# Patient Record
Sex: MALE | Race: OTHER | Employment: PART TIME | URBAN - METROPOLITAN AREA
[De-identification: names, ages, dates, MRNs, and addresses within clinical notes are randomized per-mention and may not be internally consistent; named-entity substitution may affect disease eponyms.]

---

## 2022-10-31 ENCOUNTER — HOSPITAL ENCOUNTER (EMERGENCY)
Age: 27
Discharge: HOME | End: 2022-10-31
Attending: EMERGENCY MEDICINE
Payer: MEDICAID

## 2022-10-31 VITALS
HEART RATE: 69 BPM | BODY MASS INDEX: 24.34 KG/M2 | WEIGHT: 170 LBS | RESPIRATION RATE: 16 BRPM | HEIGHT: 70 IN | DIASTOLIC BLOOD PRESSURE: 68 MMHG | TEMPERATURE: 97.1 F | OXYGEN SATURATION: 97 % | SYSTOLIC BLOOD PRESSURE: 111 MMHG

## 2022-10-31 DIAGNOSIS — H65.00 ACUTE SEROUS OTITIS MEDIA, RECURRENCE NOT SPECIFIED, UNSPECIFIED LATERALITY: ICD-10-CM

## 2022-10-31 DIAGNOSIS — H60.391 INFECTIVE OTITIS EXTERNA OF RIGHT EAR: Primary | ICD-10-CM

## 2022-10-31 PROCEDURE — 99283 EMERGENCY DEPT VISIT LOW MDM: CPT

## 2022-10-31 PROCEDURE — 6370000000 HC RX 637 (ALT 250 FOR IP): Performed by: STUDENT IN AN ORGANIZED HEALTH CARE EDUCATION/TRAINING PROGRAM

## 2022-10-31 RX ORDER — AMOXICILLIN 250 MG/1
500 CAPSULE ORAL ONCE
Status: COMPLETED | OUTPATIENT
Start: 2022-10-31 | End: 2022-10-31

## 2022-10-31 RX ORDER — AMOXICILLIN 500 MG/1
500 CAPSULE ORAL 2 TIMES DAILY
Qty: 14 CAPSULE | Refills: 0 | Status: SHIPPED | OUTPATIENT
Start: 2022-10-31 | End: 2022-11-07

## 2022-10-31 RX ORDER — CIPROFLOXACIN AND DEXAMETHASONE 3; 1 MG/ML; MG/ML
4 SUSPENSION/ DROPS AURICULAR (OTIC) ONCE
Status: COMPLETED | OUTPATIENT
Start: 2022-10-31 | End: 2022-10-31

## 2022-10-31 RX ADMIN — CIPROFLOXACIN AND DEXAMETHASONE 4 DROP: 3; 1 SUSPENSION/ DROPS AURICULAR (OTIC) at 09:42

## 2022-10-31 RX ADMIN — AMOXICILLIN 500 MG: 250 CAPSULE ORAL at 08:25

## 2022-10-31 ASSESSMENT — ENCOUNTER SYMPTOMS
VOMITING: 0
ABDOMINAL PAIN: 0
RHINORRHEA: 1
COUGH: 0
DIARRHEA: 0
SHORTNESS OF BREATH: 0
NAUSEA: 0

## 2022-10-31 ASSESSMENT — PAIN SCALES - GENERAL: PAINLEVEL_OUTOF10: 8

## 2022-10-31 ASSESSMENT — PAIN - FUNCTIONAL ASSESSMENT: PAIN_FUNCTIONAL_ASSESSMENT: 0-10

## 2022-10-31 ASSESSMENT — PAIN DESCRIPTION - ORIENTATION: ORIENTATION: RIGHT

## 2022-10-31 ASSESSMENT — PAIN DESCRIPTION - LOCATION: LOCATION: EAR

## 2022-10-31 NOTE — ED PROVIDER NOTES
[facility] ED     Emergency Department     Faculty Attestation    I performed a history and physical examination of the patient and discussed management with the resident. I reviewed the residents note and agree with the documented findings and plan of care. Any areas of disagreement are noted on the chart. I was personally present for the key portions of any procedures. I have documented in the chart those procedures where I was not present during the key portions. I have reviewed the emergency nurses triage note. I agree with the chief complaint, past medical history, past surgical history, allergies, medications, social and family history as documented unless otherwise noted below. For Physician Assistant/ Nurse Practitioner cases/documentation I have personally evaluated this patient and have completed at least one if not all key elements of the E/M (history, physical exam, and MDM). Additional findings are as noted. Patient here with right ear pain, ongoing for the last week. Is tried over-the-counter drops with no relief. On exam left TM and canal are normal.  Right canal granular white discharge pain with insertion of speculum consistent with externa. However also can visualize the majority of the TM dull red with an effusion loss of landmarks. No mastoid tenderness. Will treat for both media and externa.       Critical Care     none    Maryam Lee MD, Sami Mora  Attending Emergency  Physician           Maryam Lee MD  10/31/22 1722

## 2022-10-31 NOTE — ED PROVIDER NOTES
[facility]  ED  Emergency Department Encounter  EmergencyMedicineResident       Pt Name: Amparo Harvey  MRN: [id number]  [place] 1995  Date of evaluation: 10/31/22  PCP: No primary care provider on file. CHIEF COMPLAINT       Chief Complaint   Patient presents with    Otalgia       HISTORY OF PRESENT ILLNESS  (Location/Symptom, Timing/Onset, Context/Setting, Quality, Duration, Modifying Factors, Severity.)      Amparo Harvey is a 23 y.o. male who presents evaluation today for right ear pain. It has been going on now for 1 week. Not improving with OTC ear drops. He is also had some nasal congestion and rhinorrhea. No fevers or chills. No nausea and diarrhea. Nothing has resolved his pain and is now getting worse. Symptoms are constant. PAST MEDICAL / SURGICAL /SOCIAL / FAMILY HISTORY      has no past medical history on file. has no past surgical history on file. Social History     Socioeconomic History    Marital status: Single     Spouse name: Not on file    Number of children: Not on file    Years of education: Not on file    Highest education level: Not on file   Occupational History    Not on file   Tobacco Use    Smoking status: Not on file    Smokeless tobacco: Not on file   Substance and Sexual Activity    Alcohol use: Not on file    Drug use: Not on file    Sexual activity: Not on file   Other Topics Concern    Not on file   Social History Narrative    Not on file     Social Determinants of Health     Financial Resource Strain: Not on file   Food Insecurity: Not on file   Transportation Needs: Not on file   Physical Activity: Not on file   Stress: Not on file   Social Connections: Not on file   Intimate Partner Violence: Not on file   Housing Stability: Not on file       History reviewed. No pertinent family history. Allergies:  Patient has no known allergies.     Home Medications:  Prior to Admission medications    Medication Sig Start Date End Date Taking? Authorizing Provider   amoxicillin (AMOXIL) 500 MG capsule Take 1 capsule by mouth 2 times daily for 7 days 10/31/22 11/7/22 Yes Dave Craft MD       REVIEW OF SYSTEMS    (2-9 systems for level 4, 10 or more forlevel 5)      Review of Systems   Constitutional:  Negative for chills and fever. HENT:  Positive for congestion, ear discharge, ear pain and rhinorrhea. Respiratory:  Negative for cough and shortness of breath. Cardiovascular:  Negative for chest pain. Gastrointestinal:  Negative for abdominal pain, diarrhea, nausea and vomiting. PHYSICAL EXAM   (up to 7 for level 4, 8 or more forlevel 5)      ED TRIAGE VITALS BP: 111/68, Temp: 97.1 °F (36.2 °C), Heart Rate: 69, Resp: 16, SpO2: 97 %    Vitals:    10/31/22 0813 10/31/22 0942   BP: 111/68    Pulse: 69    Resp:  16   Temp: 97.1 °F (36.2 °C)    TempSrc: Oral    SpO2: 97%    Weight: 170 lb (77.1 kg)    Height: 5' 10\" (1.778 m)          Physical Exam  Vitals and nursing note reviewed. Constitutional:       Appearance: Normal appearance. HENT:      Head: Normocephalic and atraumatic. Ears:      Comments: Erythematous swollen right external auditory canal, effusion and erythema noted to the right TM, no mastoid tenderness, left ear normal     Nose: Nose normal.      Mouth/Throat:      Mouth: Mucous membranes are moist.   Eyes:      Extraocular Movements: Extraocular movements intact. Pupils: Pupils are equal, round, and reactive to light. Cardiovascular:      Rate and Rhythm: Normal rate and regular rhythm. Pulses: Normal pulses. Heart sounds: Normal heart sounds. Pulmonary:      Effort: Pulmonary effort is normal.      Breath sounds: Normal breath sounds. Abdominal:      General: Abdomen is flat. Palpations: Abdomen is soft. Musculoskeletal:         General: Normal range of motion. Cervical back: Normal range of motion. Skin:     General: Skin is warm and dry.       Capillary Refill: Capillary refill takes less than 2 seconds. Neurological:      General: No focal deficit present. Mental Status: He is alert and oriented to person, place, and time. Psychiatric:         Mood and Affect: Mood normal.         Behavior: Behavior normal.         DIFFERENTIAL  DIAGNOSIS     PLAN (LABS / IMAGING / EKG):  No orders of the defined types were placed in this encounter. MEDICATIONS ORDERED:  ED Medication Orders (From admission, onward)      Start Ordered     Status Ordering Provider    10/31/22 0830 10/31/22 0823  amoxicillin (AMOXIL) capsule 500 mg  ONCE        Question:  Antimicrobial Indications  Answer:  Head and Neck Infection    Last MAR action: Given - by Comfort Bernardo on 10/31/22 at [facility], [address]    10/31/22 0830 10/31/22 0823  ciprofloxacin-dexamethasone (CIPRODEX) otic suspension 4 drop  ONCE         Last MAR action: Given - by Comfort Bernardo on 10/31/22 at [address] [facility]              DIAGNOSTIC RESULTS / EMERGENCY DEPARTMENT COURSE / MDM     IMPRESSION & INITIAL PLAN:  Patient presenting today for evaluation of right ear pain. It has been going on now for 1 week. Not improving with OTC ear drops. On exam he has an otitis externa and otitis media of the right ear. No other extension. Mastoid nontender. Will start on otic and oral antibiotics. LABS:  No results found for this visit on 10/31/22. RADIOLOGY:  No orders to display       FINAL IMPRESSION      1. Infective otitis externa of right ear    2.  Acute serous otitis media, recurrence not specified, unspecified laterality          DISPOSITION / PLAN     DISPOSITION Decision To Discharge 10/31/2022 08:23:08 AM      PATIENT REFERRED TO:  [address] [place] [id number] [phone]  In 1 week  For wound re-check      DISCHARGE MEDICATIONS:  Discharge Medication List as of 10/31/2022  8:24 AM        START taking these medications    Details   amoxicillin (AMOXIL) 500 MG capsule Take 1 capsule by mouth 2 times daily for 7 days, Disp-14 capsule, R-0Print           Discharge Medication List as of 10/31/2022  8:24 AM           Jonatan Driscoll MD  Emergency Medicine Resident    (Please note that portions of this note were completed with a voice recognition program.  Efforts were made to edit the dictations but occasionally words are mis-transcribed.)       Jonatan Driscoll MD  Resident  10/31/22 0076

## 2022-10-31 NOTE — DISCHARGE INSTRUCTIONS
Place the eardrops in the right ear 2 times daily for 7 days. Take the antibiotics 1 pill 2 times daily for 7 days. Follow-up with her primary care physician to reevaluate your right ear. Return the emergency department for any emergencies. He can take Tylenol or Motrin as directed on the bottle for symptom relief and fever relief.

## 2023-04-14 ENCOUNTER — ANESTHESIA EVENT (OUTPATIENT)
Dept: OPERATING ROOM | Age: 28
End: 2023-04-14
Payer: MEDICAID

## 2023-04-17 ENCOUNTER — APPOINTMENT (OUTPATIENT)
Dept: GENERAL RADIOLOGY | Age: 28
End: 2023-04-17
Attending: PLASTIC SURGERY
Payer: MEDICAID

## 2023-04-17 ENCOUNTER — HOSPITAL ENCOUNTER (OUTPATIENT)
Age: 28
Setting detail: OUTPATIENT SURGERY
Discharge: HOME | End: 2023-04-17
Attending: PLASTIC SURGERY | Admitting: PLASTIC SURGERY
Payer: MEDICAID

## 2023-04-17 ENCOUNTER — ANESTHESIA (OUTPATIENT)
Dept: OPERATING ROOM | Age: 28
End: 2023-04-17
Payer: MEDICAID

## 2023-04-17 VITALS
RESPIRATION RATE: 18 BRPM | OXYGEN SATURATION: 100 % | HEIGHT: 71 IN | HEART RATE: 57 BPM | BODY MASS INDEX: 24.53 KG/M2 | WEIGHT: 175.25 LBS | TEMPERATURE: 97.1 F | DIASTOLIC BLOOD PRESSURE: 74 MMHG | SYSTOLIC BLOOD PRESSURE: 119 MMHG

## 2023-04-17 DIAGNOSIS — G89.18 PAIN FOLLOWING SURGERY OR PROCEDURE: Primary | ICD-10-CM

## 2023-04-17 PROCEDURE — 6360000002 HC RX W HCPCS: Performed by: PLASTIC SURGERY

## 2023-04-17 PROCEDURE — 6360000002 HC RX W HCPCS

## 2023-04-17 PROCEDURE — 6370000000 HC RX 637 (ALT 250 FOR IP): Performed by: PLASTIC SURGERY

## 2023-04-17 PROCEDURE — 2580000003 HC RX 258: Performed by: ANESTHESIOLOGY

## 2023-04-17 PROCEDURE — 2580000003 HC RX 258: Performed by: PLASTIC SURGERY

## 2023-04-17 PROCEDURE — 6370000000 HC RX 637 (ALT 250 FOR IP)

## 2023-04-17 PROCEDURE — 3700000001 HC ADD 15 MINUTES (ANESTHESIA): Performed by: PLASTIC SURGERY

## 2023-04-17 PROCEDURE — 2709999900 HC NON-CHARGEABLE SUPPLY: Performed by: PLASTIC SURGERY

## 2023-04-17 PROCEDURE — 3600000002 HC SURGERY LEVEL 2 BASE: Performed by: PLASTIC SURGERY

## 2023-04-17 PROCEDURE — 7100000001 HC PACU RECOVERY - ADDTL 15 MIN: Performed by: PLASTIC SURGERY

## 2023-04-17 PROCEDURE — 3209999900 FLUORO FOR SURGICAL PROCEDURES

## 2023-04-17 PROCEDURE — 7100000000 HC PACU RECOVERY - FIRST 15 MIN: Performed by: PLASTIC SURGERY

## 2023-04-17 PROCEDURE — 7100000010 HC PHASE II RECOVERY - FIRST 15 MIN: Performed by: PLASTIC SURGERY

## 2023-04-17 PROCEDURE — 7100000011 HC PHASE II RECOVERY - ADDTL 15 MIN: Performed by: PLASTIC SURGERY

## 2023-04-17 PROCEDURE — 3700000000 HC ANESTHESIA ATTENDED CARE: Performed by: PLASTIC SURGERY

## 2023-04-17 PROCEDURE — 2500000003 HC RX 250 WO HCPCS: Performed by: PLASTIC SURGERY

## 2023-04-17 PROCEDURE — C1713 ANCHOR/SCREW BN/BN,TIS/BN: HCPCS | Performed by: PLASTIC SURGERY

## 2023-04-17 PROCEDURE — 3600000012 HC SURGERY LEVEL 2 ADDTL 15MIN: Performed by: PLASTIC SURGERY

## 2023-04-17 PROCEDURE — 2500000003 HC RX 250 WO HCPCS

## 2023-04-17 DEVICE — K WIRE FIX L6IN DIA0.045IN 1600645] MICROAIRE SURGICAL INSTRUMENTS INC]: Type: IMPLANTABLE DEVICE | Site: LITTLE FINGER | Status: FUNCTIONAL

## 2023-04-17 RX ORDER — MORPHINE SULFATE 2 MG/ML
1 INJECTION, SOLUTION INTRAMUSCULAR; INTRAVENOUS EVERY 5 MIN PRN
Status: DISCONTINUED | OUTPATIENT
Start: 2023-04-17 | End: 2023-04-17 | Stop reason: HOSPADM

## 2023-04-17 RX ORDER — DIPHENHYDRAMINE HYDROCHLORIDE 50 MG/ML
12.5 INJECTION INTRAMUSCULAR; INTRAVENOUS
Status: DISCONTINUED | OUTPATIENT
Start: 2023-04-17 | End: 2023-04-17 | Stop reason: HOSPADM

## 2023-04-17 RX ORDER — OXYCODONE HYDROCHLORIDE AND ACETAMINOPHEN 5; 325 MG/1; MG/1
1 TABLET ORAL EVERY 6 HOURS PRN
Qty: 28 TABLET | Refills: 0 | Status: SHIPPED | OUTPATIENT
Start: 2023-04-17 | End: 2023-04-24

## 2023-04-17 RX ORDER — SODIUM CHLORIDE 0.9 % (FLUSH) 0.9 %
5-40 SYRINGE (ML) INJECTION PRN
Status: DISCONTINUED | OUTPATIENT
Start: 2023-04-17 | End: 2023-04-17 | Stop reason: HOSPADM

## 2023-04-17 RX ORDER — CEPHALEXIN 500 MG/1
500 CAPSULE ORAL 3 TIMES DAILY
Qty: 21 CAPSULE | Refills: 0 | Status: SHIPPED | OUTPATIENT
Start: 2023-04-17 | End: 2023-04-24

## 2023-04-17 RX ORDER — PROPOFOL 10 MG/ML
INJECTION, EMULSION INTRAVENOUS PRN
Status: DISCONTINUED | OUTPATIENT
Start: 2023-04-17 | End: 2023-04-17 | Stop reason: SDUPTHER

## 2023-04-17 RX ORDER — SODIUM CHLORIDE 9 MG/ML
25 INJECTION, SOLUTION INTRAVENOUS PRN
Status: DISCONTINUED | OUTPATIENT
Start: 2023-04-17 | End: 2023-04-17 | Stop reason: HOSPADM

## 2023-04-17 RX ORDER — DEXAMETHASONE SODIUM PHOSPHATE 10 MG/ML
INJECTION, SOLUTION INTRAMUSCULAR; INTRAVENOUS PRN
Status: DISCONTINUED | OUTPATIENT
Start: 2023-04-17 | End: 2023-04-17 | Stop reason: SDUPTHER

## 2023-04-17 RX ORDER — SODIUM CHLORIDE 9 MG/ML
INJECTION, SOLUTION INTRAVENOUS PRN
Status: DISCONTINUED | OUTPATIENT
Start: 2023-04-17 | End: 2023-04-17 | Stop reason: HOSPADM

## 2023-04-17 RX ORDER — GINSENG 100 MG
CAPSULE ORAL
Status: DISCONTINUED
Start: 2023-04-17 | End: 2023-04-17 | Stop reason: HOSPADM

## 2023-04-17 RX ORDER — SODIUM CHLORIDE 0.9 % (FLUSH) 0.9 %
5-40 SYRINGE (ML) INJECTION EVERY 12 HOURS SCHEDULED
Status: DISCONTINUED | OUTPATIENT
Start: 2023-04-17 | End: 2023-04-17 | Stop reason: HOSPADM

## 2023-04-17 RX ORDER — GINSENG 100 MG
CAPSULE ORAL PRN
Status: DISCONTINUED | OUTPATIENT
Start: 2023-04-17 | End: 2023-04-17 | Stop reason: ALTCHOICE

## 2023-04-17 RX ORDER — OXYCODONE HYDROCHLORIDE AND ACETAMINOPHEN 5; 325 MG/1; MG/1
TABLET ORAL
Status: COMPLETED
Start: 2023-04-17 | End: 2023-04-17

## 2023-04-17 RX ORDER — OXYCODONE HYDROCHLORIDE 5 MG/1
10 TABLET ORAL PRN
Status: DISCONTINUED | OUTPATIENT
Start: 2023-04-17 | End: 2023-04-17 | Stop reason: HOSPADM

## 2023-04-17 RX ORDER — MIDAZOLAM HYDROCHLORIDE 1 MG/ML
INJECTION INTRAMUSCULAR; INTRAVENOUS PRN
Status: DISCONTINUED | OUTPATIENT
Start: 2023-04-17 | End: 2023-04-17 | Stop reason: SDUPTHER

## 2023-04-17 RX ORDER — HYDRALAZINE HYDROCHLORIDE 20 MG/ML
10 INJECTION INTRAMUSCULAR; INTRAVENOUS
Status: DISCONTINUED | OUTPATIENT
Start: 2023-04-17 | End: 2023-04-17 | Stop reason: HOSPADM

## 2023-04-17 RX ORDER — LIDOCAINE HYDROCHLORIDE 10 MG/ML
INJECTION, SOLUTION INFILTRATION; PERINEURAL PRN
Status: DISCONTINUED | OUTPATIENT
Start: 2023-04-17 | End: 2023-04-17 | Stop reason: SDUPTHER

## 2023-04-17 RX ORDER — KETOROLAC TROMETHAMINE 30 MG/ML
INJECTION, SOLUTION INTRAMUSCULAR; INTRAVENOUS PRN
Status: DISCONTINUED | OUTPATIENT
Start: 2023-04-17 | End: 2023-04-17 | Stop reason: SDUPTHER

## 2023-04-17 RX ORDER — METOCLOPRAMIDE HYDROCHLORIDE 5 MG/ML
10 INJECTION INTRAMUSCULAR; INTRAVENOUS
Status: DISCONTINUED | OUTPATIENT
Start: 2023-04-17 | End: 2023-04-17 | Stop reason: HOSPADM

## 2023-04-17 RX ORDER — CEFAZOLIN 2 G/1
INJECTION, POWDER, FOR SOLUTION INTRAMUSCULAR; INTRAVENOUS
Status: DISCONTINUED
Start: 2023-04-17 | End: 2023-04-17 | Stop reason: HOSPADM

## 2023-04-17 RX ORDER — OXYCODONE HYDROCHLORIDE 5 MG/1
5 TABLET ORAL PRN
Status: DISCONTINUED | OUTPATIENT
Start: 2023-04-17 | End: 2023-04-17 | Stop reason: HOSPADM

## 2023-04-17 RX ORDER — FENTANYL CITRATE 50 UG/ML
INJECTION, SOLUTION INTRAMUSCULAR; INTRAVENOUS PRN
Status: DISCONTINUED | OUTPATIENT
Start: 2023-04-17 | End: 2023-04-17 | Stop reason: SDUPTHER

## 2023-04-17 RX ORDER — GLYCOPYRROLATE 1 MG/5 ML
SYRINGE (ML) INTRAVENOUS PRN
Status: DISCONTINUED | OUTPATIENT
Start: 2023-04-17 | End: 2023-04-17 | Stop reason: SDUPTHER

## 2023-04-17 RX ORDER — SODIUM CHLORIDE, SODIUM LACTATE, POTASSIUM CHLORIDE, CALCIUM CHLORIDE 600; 310; 30; 20 MG/100ML; MG/100ML; MG/100ML; MG/100ML
INJECTION, SOLUTION INTRAVENOUS CONTINUOUS
Status: DISCONTINUED | OUTPATIENT
Start: 2023-04-17 | End: 2023-04-17 | Stop reason: HOSPADM

## 2023-04-17 RX ORDER — BUPIVACAINE HYDROCHLORIDE 2.5 MG/ML
INJECTION, SOLUTION INFILTRATION; PERINEURAL PRN
Status: DISCONTINUED | OUTPATIENT
Start: 2023-04-17 | End: 2023-04-17 | Stop reason: ALTCHOICE

## 2023-04-17 RX ORDER — MEPERIDINE HYDROCHLORIDE 50 MG/ML
12.5 INJECTION INTRAMUSCULAR; INTRAVENOUS; SUBCUTANEOUS ONCE
Status: DISCONTINUED | OUTPATIENT
Start: 2023-04-17 | End: 2023-04-17 | Stop reason: HOSPADM

## 2023-04-17 RX ORDER — ONDANSETRON 2 MG/ML
4 INJECTION INTRAMUSCULAR; INTRAVENOUS
Status: DISCONTINUED | OUTPATIENT
Start: 2023-04-17 | End: 2023-04-17 | Stop reason: HOSPADM

## 2023-04-17 RX ORDER — ONDANSETRON 2 MG/ML
INJECTION INTRAMUSCULAR; INTRAVENOUS PRN
Status: DISCONTINUED | OUTPATIENT
Start: 2023-04-17 | End: 2023-04-17 | Stop reason: SDUPTHER

## 2023-04-17 RX ORDER — OXYCODONE HYDROCHLORIDE AND ACETAMINOPHEN 5; 325 MG/1; MG/1
1 TABLET ORAL ONCE
Status: COMPLETED | OUTPATIENT
Start: 2023-04-17 | End: 2023-04-17

## 2023-04-17 RX ADMIN — PROPOFOL 200 MG: 10 INJECTION, EMULSION INTRAVENOUS at 10:33

## 2023-04-17 RX ADMIN — ONDANSETRON 4 MG: 2 INJECTION INTRAMUSCULAR; INTRAVENOUS at 10:56

## 2023-04-17 RX ADMIN — SODIUM CHLORIDE, POTASSIUM CHLORIDE, SODIUM LACTATE AND CALCIUM CHLORIDE: 600; 310; 30; 20 INJECTION, SOLUTION INTRAVENOUS at 10:02

## 2023-04-17 RX ADMIN — DEXAMETHASONE SODIUM PHOSPHATE 5 MG: 10 INJECTION, SOLUTION INTRAMUSCULAR; INTRAVENOUS at 10:41

## 2023-04-17 RX ADMIN — FENTANYL CITRATE 100 MCG: 50 INJECTION, SOLUTION INTRAMUSCULAR; INTRAVENOUS at 10:33

## 2023-04-17 RX ADMIN — MIDAZOLAM 4 MG: 1 INJECTION INTRAMUSCULAR; INTRAVENOUS at 10:29

## 2023-04-17 RX ADMIN — KETOROLAC TROMETHAMINE 30 MG: 30 INJECTION, SOLUTION INTRAMUSCULAR at 10:56

## 2023-04-17 RX ADMIN — Medication 0.5 MG: at 11:40

## 2023-04-17 RX ADMIN — OXYCODONE HYDROCHLORIDE AND ACETAMINOPHEN 1 TABLET: 5; 325 TABLET ORAL at 11:51

## 2023-04-17 RX ADMIN — CEFAZOLIN 2000 MG: 2 INJECTION, POWDER, FOR SOLUTION INTRAMUSCULAR; INTRAVENOUS at 10:38

## 2023-04-17 RX ADMIN — OXYCODONE AND ACETAMINOPHEN 1 TABLET: 5; 325 TABLET ORAL at 11:51

## 2023-04-17 RX ADMIN — HYDROMORPHONE HYDROCHLORIDE 0.5 MG: 1 INJECTION, SOLUTION INTRAMUSCULAR; INTRAVENOUS; SUBCUTANEOUS at 11:40

## 2023-04-17 RX ADMIN — LIDOCAINE HYDROCHLORIDE 40 MG: 10 INJECTION, SOLUTION INFILTRATION; PERINEURAL at 10:33

## 2023-04-17 RX ADMIN — Medication 0.3 MG: at 11:12

## 2023-04-17 ASSESSMENT — PAIN SCALES - GENERAL
PAINLEVEL_OUTOF10: 8
PAINLEVEL_OUTOF10: 9
PAINLEVEL_OUTOF10: 8
PAINLEVEL_OUTOF10: 8
PAINLEVEL_OUTOF10: 7

## 2023-04-17 ASSESSMENT — PAIN DESCRIPTION - LOCATION
LOCATION: HAND
LOCATION: ARM

## 2023-04-17 ASSESSMENT — PAIN - FUNCTIONAL ASSESSMENT: PAIN_FUNCTIONAL_ASSESSMENT: 0-10

## 2023-04-17 ASSESSMENT — PAIN DESCRIPTION - ORIENTATION
ORIENTATION: LEFT

## 2023-04-17 ASSESSMENT — PAIN DESCRIPTION - DESCRIPTORS
DESCRIPTORS: THROBBING
DESCRIPTORS: THROBBING
DESCRIPTORS: ACHING;THROBBING
DESCRIPTORS: ACHING;THROBBING
DESCRIPTORS: DULL

## 2023-04-17 ASSESSMENT — PAIN DESCRIPTION - PAIN TYPE: TYPE: SURGICAL PAIN

## 2023-04-17 NOTE — BRIEF OP NOTE
Brief Postoperative Note      Patient: Brian Sutton  YOB: 1995  MRN: [id number]    Date of Procedure: 4/17/2023    Pre-Op diagnosis: Displaced fracture base of left little finger proximal phalanx    Post-Op Diagnosis: Same       Procedure(s):  LEFT LITTLE FINGER CLOSED REDUCTION PINNING using 2x0. 045 K wires    Surgeon(s): Keely Fung MD    Assistant:  Resident: Hernando Hernandez DO    Anesthesia: General    Estimated Blood Loss (mL): Minimal    Complications: None    Specimens:   * No specimens in log *    Implants:  Implant Name Type Inv.  Item Serial No.  Lot No. LRB No. Used Action   K WIRE FIX L6IN DIA0.045IN [id number]] [facility] [employer]] - [id number]  K WIRE FIX L6IN DIA0.045IN [id number]] [employer]]  [facility] [employer]-WD [id number] Left 2 Implanted         Drains: * No LDAs found *    Findings: Displaced fracture of distal phalanx of left little finger, near-anatomic reduction post-pin placement      Electronically signed by Sandra Zhu DO on 4/17/2023 at 11:05 AM

## 2023-04-17 NOTE — ANESTHESIA POSTPROCEDURE EVALUATION
POST- ANESTHESIA EVALUATION       Pt Name: Herb Vanegas  MRN: [id number]  [place]: 1995  Date of evaluation: 4/17/2023  Time:  12:32 PM      /74   Pulse 57   Temp 97.1 °F (36.2 °C) (Temporal)   Resp 18   Ht 5' 11\" (1.803 m)   Wt 175 lb 4 oz (79.5 kg)   SpO2 100%   BMI 24.44 kg/m²      Consciousness Level  Awake  Cardiopulmonary Status  Stable  Pain Adequately Treated YES  Nausea / Vomiting  NO  Adequate Hydration  YES  Anesthesia Related Complications NONE      Electronically signed by Nichole Velazco MD on 4/17/2023 at 12:32 PM       Department of Anesthesiology  Postprocedure Note    Patient: Herb Vanegas  MRN: [id number]  [place]: 1995  Date of evaluation: 4/17/2023      Procedure Summary     Date: 04/17/23 Room / Location: [address]    Anesthesia Start: 6948 Anesthesia Stop: 7975    Procedure: LEFT LITTLE FINGER CLOSED REDUCTION PINNING (Left: Little Finger) Diagnosis:       Displaced fracture of distal phalanx of left little finger, initial encounter for closed fracture      (Displaced fracture of distal phalanx of left little finger, initial encounter for closed fracture [R28.519H])    Surgeons: Nita Su MD Responsible Provider: Nichole Velazco MD    Anesthesia Type: general ASA Status: 2          Anesthesia Type: No value filed.     Sundar Phase I: Sundar Score: 9    Sundar Phase II: Sundar Score: 10      Anesthesia Post Evaluation

## 2023-04-17 NOTE — OP NOTE
Operative Note      Patient: Silas Pitt  YOB: 1995  MRN: [id number]    Date of Procedure: 4/17/2023    Pre-Op Diagnosis Codes:     * Displaced fracture of distal phalanx of left little finger, initial encounter for closed fracture [S62.637A]    Post-Op Diagnosis: Same       Procedure(s):  LEFT LITTLE FINGER CLOSED REDUCTION PINNING  Close reduction and percutaneous pinning left lower finger proximal phalanx fracture  Using 2x0. 045 K wires  Surgeon(s): Hood Guzman MD    Assistant:   Resident: Bernadine Kiser DO    Anesthesia: General    Estimated Blood Loss (mL): Minimal    Complications: None    Specimens:   * No specimens in log *    Implants:  Implant Name Type Inv. Item Serial No.  Lot No. LRB No. Used Action   K WIRE FIX L6IN DIA0.045IN [id number]] [facility] [employer]] - [id number]  K WIRE FIX L6IN DIA0.045IN [id number]] [employer]]  [facility] [employer]-WD [id number] Left 2 Implanted         Drains: * No LDAs found *    Findings: Good anatomic alignment and good pin placement confirmed on fluoroscopy. Detailed Description of Procedure: The patient was brought into the operating room and placed under general anesthesia. His left arm was prepped and draped in sterile fashion. Fluoroscopy confirmed the fracture at the base of the left little finger proximal phalanx. A closed duction was performed. Fluoroscopy confirmed good reduction of the fracture. 2x0.045 K wires were placed across the fracture fragments in a crisscross fashion. Fluoroscopy confirmed good pin placement. The pins were cut and Jurgan balls placed. 0.25% Marcaine plain was injected for postoperative pain control. A volar splint was applied and he was taken to postop recovery in stable condition.     Electronically signed by Hood Guzman MD on 4/17/2023 at 11:10 AM

## 2023-04-17 NOTE — INTERVAL H&P NOTE
Update History & Physical    The patient's History and Physical of April 10, 2023 was reviewed with the patient and I examined the patient. There was no change. The surgical site was confirmed by the patient and me. Plan: The risks, benefits, expected outcome, and alternative to the recommended procedure have been discussed with the patient. Patient understands and wants to proceed with the procedure.      Electronically signed by Adrian Inman DO on 4/17/2023 at 10:19 AM

## 2023-05-02 PROBLEM — S62.619A CLOSED FRACTURE OF PROXIMAL PHALANX OF LEFT HAND: Status: ACTIVE | Noted: 2023-05-02

## 2024-04-13 ENCOUNTER — HOSPITAL ENCOUNTER (EMERGENCY)
Age: 29
Discharge: HOME | End: 2024-04-13
Attending: EMERGENCY MEDICINE
Payer: MEDICAID

## 2024-04-13 VITALS
DIASTOLIC BLOOD PRESSURE: 74 MMHG | WEIGHT: 171.96 LBS | RESPIRATION RATE: 16 BRPM | SYSTOLIC BLOOD PRESSURE: 122 MMHG | TEMPERATURE: 97.9 F | HEART RATE: 73 BPM | OXYGEN SATURATION: 100 % | BODY MASS INDEX: 23.98 KG/M2

## 2024-04-13 DIAGNOSIS — Z20.2 EXPOSURE TO STD: Primary | ICD-10-CM

## 2024-04-13 PROCEDURE — 87661 TRICHOMONAS VAGINALIS AMPLIF: CPT

## 2024-04-13 PROCEDURE — 99283 EMERGENCY DEPT VISIT LOW MDM: CPT

## 2024-04-13 PROCEDURE — 87491 CHLMYD TRACH DNA AMP PROBE: CPT

## 2024-04-13 PROCEDURE — 87591 N.GONORRHOEAE DNA AMP PROB: CPT

## 2024-04-13 ASSESSMENT — ENCOUNTER SYMPTOMS: COUGH: 0

## 2024-04-13 ASSESSMENT — PAIN - FUNCTIONAL ASSESSMENT: PAIN_FUNCTIONAL_ASSESSMENT: NONE - DENIES PAIN

## 2024-04-13 NOTE — ED NOTES
The following labs were labeled with patient stickers & tubed to lab;    []Lavender   []On Ice  []Blue  []Green/ Yellow  []Green/ Black []On Ice  []Pink  []Red  []Yellow    []COVID-19 Swab []Rapid    [x]Urine Sample  []Pelvic Cultures    []Blood Cultures

## 2024-04-14 LAB
SOURCE: ABNORMAL
TRICHOMONAS VAGINALI, MOLECULAR: POSITIVE

## 2024-04-14 NOTE — ED NOTES
Pt presents to the ED with concern for std.  Pt denies any symptoms at this time but would like to be tested.  Pt A&O x 4, does not appear in acute distress, RR even and unlabored.  Pt sitting in a chair with eyes open.   Vital signs obtained, medical hx and allergies reviewed with pt.   Triage completed.

## 2024-04-14 NOTE — ED PROVIDER NOTES
[facility] ED  EMERGENCY DEPARTMENT ENCOUNTER      Pt Name: Rogelio Solorio  MRN: [id number]  Birthdate 1995  Date of evaluation: 4/13/24  PCP:  No primary care provider on file.    CHIEF COMPLAINT:   Chief Complaint   Patient presents with    Exposure to STD     HISTORY OF PRESENT ILLNESS   Rogelio Solorio is a 28 y.o. male whopresents with requesting testing for STDs.  He thinks he was exposed to an STD but he is not \"100% sure\".  He denies any complaints he has no dysuria penile pain or lesions.  He denies any other symptoms no fevers, chills, chest pain, cough, shortness of breath.        REVIEW OF SYSTEMS       Review of Systems   Constitutional:  Negative for chills and fever.   Respiratory:  Negative for cough.    Cardiovascular:  Negative for chest pain.   Genitourinary:  Negative for dysuria, flank pain, frequency, hematuria and urgency.           PAST MEDICAL HISTORY   PMH:  has a past medical history of Schizophrenia ([facility]).  SurgicalHistory:  has a past surgical history that includes Femur fracture surgery (Bilateral); Finger closed reduction w/ percutaneous pinning (Left, 04/17/2023); and Finger Closed Reduction (Left, 4/17/2023).  Social History:  reports that he has never smoked. He has never used smokeless tobacco. He reports that he does not currently use alcohol. He reports current drug use. Frequency: 7.00 times per week. Drug: Marijuana (Weed).  Family History: Noncontributory at this time  Psychiatric History: Noncontributory at this time    Allergies:has No Known Allergies.      PHYSICAL EXAM     INITIAL VITALS: /74   Pulse 73   Temp 97.9 °F (36.6 °C) (Oral)   Resp 16   Wt 78 kg (171 lb 15.3 oz)   SpO2 100%   BMI 23.98 kg/m²      Physical Exam  Constitutional:       General: He is not in acute distress.     Appearance: He is well-developed. He is not diaphoretic.   HENT:      Head: Normocephalic and atraumatic.   Eyes:      General: No scleral icterus.

## 2024-04-15 ENCOUNTER — HOSPITAL ENCOUNTER (EMERGENCY)
Age: 29
Discharge: HOME | End: 2024-04-16
Attending: EMERGENCY MEDICINE
Payer: MEDICAID

## 2024-04-15 VITALS
OXYGEN SATURATION: 97 % | SYSTOLIC BLOOD PRESSURE: 119 MMHG | HEART RATE: 83 BPM | RESPIRATION RATE: 20 BRPM | TEMPERATURE: 98.3 F | DIASTOLIC BLOOD PRESSURE: 70 MMHG

## 2024-04-15 DIAGNOSIS — A59.9 TRICHOMONAS INFECTION: Primary | ICD-10-CM

## 2024-04-15 LAB
CHLAMYDIA DNA UR QL NAA+PROBE: NEGATIVE
N GONORRHOEA DNA UR QL NAA+PROBE: NEGATIVE
SPECIMEN DESCRIPTION: NORMAL

## 2024-04-15 PROCEDURE — 99283 EMERGENCY DEPT VISIT LOW MDM: CPT | Performed by: EMERGENCY MEDICINE

## 2024-04-15 PROCEDURE — 6370000000 HC RX 637 (ALT 250 FOR IP)

## 2024-04-15 RX ORDER — METRONIDAZOLE 500 MG/1
500 TABLET ORAL 2 TIMES DAILY
Qty: 13 TABLET | Refills: 0 | Status: SHIPPED | OUTPATIENT
Start: 2024-04-15 | End: 2024-04-22

## 2024-04-15 RX ORDER — METRONIDAZOLE 500 MG/1
500 TABLET ORAL ONCE
Status: COMPLETED | OUTPATIENT
Start: 2024-04-15 | End: 2024-04-15

## 2024-04-15 RX ADMIN — METRONIDAZOLE 500 MG: 500 TABLET ORAL at 23:26

## 2024-04-16 NOTE — DISCHARGE INSTRUCTIONS
Thank you for visiting [facility] Emergency Department.    You need to call No primary care provider on file. to make an appointment as directed for follow up.    Should you have any questions regarding your care or further treatment, please call [facility] Emergency Department at [phone].    Please return to the department for any new or worrisome symptoms including any vomiting, fever, testicular pain, abdominal pain.  Take all antibiotics even if you start feeling better.  Abstain from drinking any alcohol while you are taking Flagyl as it can make you very sick.

## 2024-04-16 NOTE — ED TRIAGE NOTES
27 yo male arrived to ED to be treated for Sti.  Patient states he tested positive for Trichomonas.   Patient is alert and oriented times 4, speaking full sentences, and answering questions appropriately

## 2024-04-16 NOTE — ED PROVIDER NOTES
the shaft, but no open sores.  No active drainage.      Medical Decision Making  Treat for trichomonas.  Partner also present, will also be evaluated and treated for trichomonas    Problems Addressed:  Trichomonas infection: acute illness or injury    Amount and/or Complexity of Data Reviewed  External Data Reviewed: labs and notes.    Risk  Prescription drug management.         Critical Care: None     Jada Griffith MD  Attending Emergency Physician         Jada Griffith MD  04/16/24 0003    
  Eyes:      Conjunctiva/sclera: Conjunctivae normal.   Cardiovascular:      Rate and Rhythm: Normal rate and regular rhythm.      Pulses: Normal pulses.   Pulmonary:      Effort: Pulmonary effort is normal. No respiratory distress.      Breath sounds: Normal breath sounds. No wheezing.   Abdominal:      Palpations: Abdomen is soft.      Tenderness: There is no abdominal tenderness.   : No visualized rashes or lesions  Musculoskeletal:         General: Normal range of motion.      Cervical back: Normal range of motion.   Skin:     General: Skin is warm and dry.      Capillary Refill: Capillary refill takes less than 2 seconds.   Neurological:      General: No focal deficit present.      Mental Status: He is alert and oriented to person, place, and time.       DDX/DIAGNOSTIC RESULTS / EMERGENCY DEPARTMENT COURSE / MDM     Medical Decision Making  Risk  Prescription drug management.        EMERGENCY DEPARTMENT COURSE:  28-year-old male presented to ED with complaints of dysuria and recently seen in ED with positive test for trichomonas and requesting treatment.  No vomiting or fever.  On exam, no rashes or lesions visualized to  region, abdomen soft and nontender, afebrile, nontachycardic.  Patient started on treatment for trichomonas.  Given first dose of Flagyl in ED.  Instructed to abstain from any alcohol consumption while being treated with Flagyl.  Instructed to follow-up with PCP and return to ED for any abdominal pain, vomiting, fever, testicular pain.  Patient agreeable to plan.  Instructed to abstain from any sexual intercourse while being treated for STD.         PROCEDURES:  None    CONSULTS:  None        FINAL IMPRESSION      1. Trichomonas infection          DISPOSITION / PLAN     DISPOSITION Decision To Discharge 04/15/2024 11:34:42 PM      PATIENT REFERRED TO:  [facility] AT [address] [address] [phone]  Call in 2 days      [facility]

## 2024-07-01 ENCOUNTER — TELEPHONE (OUTPATIENT)
Dept: OBGYN | Age: 29
End: 2024-07-01

## 2024-07-01 DIAGNOSIS — A74.9 CHLAMYDIA: Primary | ICD-10-CM

## 2024-07-01 RX ORDER — DOXYCYCLINE HYCLATE 100 MG
100 TABLET ORAL 2 TIMES DAILY
Qty: 14 TABLET | Refills: 0 | Status: SHIPPED | OUTPATIENT
Start: 2024-07-01 | End: 2024-07-08

## 2024-07-01 NOTE — TELEPHONE ENCOUNTER
Obstetric/Gynecology Resident Interval Note    Patient's partner with chlamydia and desired expedited partner therapy. Doxycycline 100 mg BID x7 days is sent for patient per patient request.    Bubba Irizarry MD  OB/GYN Resident, PGY1  [place], [place]  7/1/2024, 5:20 PM

## (undated) DEVICE — MHPB HAND AND FOOT PACK: Brand: MEDLINE INDUSTRIES, INC.

## (undated) DEVICE — STRAP,POSITIONING,KNEE/BODY,FOAM,4X60": Brand: MEDLINE

## (undated) DEVICE — SPLINT THMB W3XL12IN FBRGLS PD PRECUT LTWT DURABLE FAST SET

## (undated) DEVICE — BNDG,ELSTC,MATRIX,STRL,3"X5YD,LF,HOOK&LP: Brand: MEDLINE

## (undated) DEVICE — CUFF TOURNIQUET 18 SNG BLADDER DUAL PORT

## (undated) DEVICE — SOLUTION SURG PREP ANTIMICROBIAL 4 OZ SKIN WND EXIDINE

## (undated) DEVICE — PADDING,UNDERCAST,COTTON, 4"X4YD STERILE: Brand: MEDLINE

## (undated) DEVICE — ELECTRODE PT RET AD L9FT HI MOIST COND ADH HYDRGEL CORDED

## (undated) DEVICE — STERILE POLYISOPRENE POWDER-FREE SURGICAL GLOVES WITH EMOLLIENT COATING: Brand: PROTEXIS

## (undated) DEVICE — DRAPE EQUIP C ARM 85X54 IN MINI CLR LF DISP

## (undated) DEVICE — DRESSING PETRO W3XL3IN OIL EMUL N ADH GZ KNIT IMPREG CELOS

## (undated) DEVICE — SOLUTION IRRIG 1000ML STRL H2O USP PLAS POUR BTL

## (undated) DEVICE — PREMIUM DRY TRAY LF: Brand: MEDLINE INDUSTRIES, INC.

## (undated) DEVICE — SOLUTION IRRIG 1000ML 0.9% SOD CHL USP POUR PLAS BTL

## (undated) DEVICE — STERILE POLYISOPRENE POWDER-FREE SURGICAL GLOVES: Brand: PROTEXIS